# Patient Record
Sex: FEMALE | Race: WHITE | NOT HISPANIC OR LATINO | URBAN - NONMETROPOLITAN AREA
[De-identification: names, ages, dates, MRNs, and addresses within clinical notes are randomized per-mention and may not be internally consistent; named-entity substitution may affect disease eponyms.]

---

## 2017-05-25 ENCOUNTER — HISTORY (OUTPATIENT)
Dept: MEDSURG UNIT | Facility: OTHER | Age: 57
End: 2017-05-25

## 2017-06-02 ENCOUNTER — AMBULATORY - GICH (OUTPATIENT)
Dept: SCHEDULING | Facility: OTHER | Age: 57
End: 2017-06-02

## 2017-06-20 ENCOUNTER — OFFICE VISIT - GICH (OUTPATIENT)
Dept: OTOLARYNGOLOGY | Facility: OTHER | Age: 57
End: 2017-06-20

## 2017-06-20 DIAGNOSIS — Z00.00 ENCOUNTER FOR GENERAL ADULT MEDICAL EXAMINATION WITHOUT ABNORMAL FINDINGS: ICD-10-CM

## 2017-08-08 ENCOUNTER — AMBULATORY - GICH (OUTPATIENT)
Dept: SCHEDULING | Facility: OTHER | Age: 57
End: 2017-08-08

## 2017-12-22 ENCOUNTER — AMBULATORY - GICH (OUTPATIENT)
Dept: PHYSICAL THERAPY | Facility: OTHER | Age: 57
End: 2017-12-22

## 2017-12-22 DIAGNOSIS — M51.16 INTERVERTEBRAL DISC DISORDER WITH RADICULOPATHY OF LUMBAR REGION: ICD-10-CM

## 2017-12-27 NOTE — PROGRESS NOTES
Patient Information     Patient Name MRN Brittney Hanna [id number] Female 1960      Progress Notes by Malika Vallejo at 2017  1:00 PM     Author:  Malika Vallejo Service:  (none) Author Type:  (none)     Filed:  2017  1:05 PM Encounter Date:  2017 Status:  Signed     :  Malika Vallejo            See scanned document.

## 2018-01-26 ENCOUNTER — TRANSFERRED RECORDS (OUTPATIENT)
Dept: HEALTH INFORMATION MANAGEMENT | Facility: CLINIC | Age: 58
End: 2018-01-26

## 2018-01-29 ENCOUNTER — DOCUMENTATION ONLY (OUTPATIENT)
Dept: FAMILY MEDICINE | Facility: OTHER | Age: 58
End: 2018-01-29

## 2018-01-29 PROBLEM — A41.9 SEPSIS DUE TO URINARY TRACT INFECTION (H): Status: ACTIVE | Noted: 2017-05-25

## 2018-01-29 PROBLEM — J44.1 COPD EXACERBATION (H): Status: ACTIVE | Noted: 2017-05-25

## 2018-01-29 PROBLEM — N39.0 SEPSIS DUE TO URINARY TRACT INFECTION (H): Status: ACTIVE | Noted: 2017-05-25

## 2018-01-29 RX ORDER — FEXOFENADINE HCL 180 MG/1
180 TABLET ORAL DAILY PRN
COMMUNITY
Start: 2016-06-24

## 2018-01-29 RX ORDER — ATORVASTATIN CALCIUM 20 MG/1
20 TABLET, FILM COATED ORAL EVERY EVENING
COMMUNITY
Start: 2016-06-24

## 2018-01-29 RX ORDER — SERTRALINE HYDROCHLORIDE 100 MG/1
200 TABLET, FILM COATED ORAL AT BEDTIME
COMMUNITY
Start: 2016-06-24

## 2018-01-29 RX ORDER — LEVOTHYROXINE SODIUM 75 UG/1
75 TABLET ORAL
COMMUNITY
Start: 2016-06-24

## 2018-01-29 RX ORDER — PEN NEEDLE, DIABETIC 30 GX3/16"
NEEDLE, DISPOSABLE MISCELLANEOUS
COMMUNITY
Start: 2016-06-24

## 2018-01-29 RX ORDER — IPRATROPIUM BROMIDE AND ALBUTEROL SULFATE 2.5; .5 MG/3ML; MG/3ML
3 SOLUTION RESPIRATORY (INHALATION) 4 TIMES DAILY
COMMUNITY
Start: 2016-06-24

## 2018-01-29 RX ORDER — ALBUTEROL SULFATE 0.83 MG/ML
2.5 SOLUTION RESPIRATORY (INHALATION) EVERY 4 HOURS PRN
COMMUNITY
Start: 2016-06-24

## 2018-01-29 RX ORDER — LISINOPRIL 5 MG/1
2.5 TABLET ORAL DAILY
COMMUNITY
Start: 2016-06-24

## 2018-01-29 RX ORDER — LEVOTHYROXINE SODIUM 200 UG/1
200 TABLET ORAL
COMMUNITY
Start: 2016-06-24

## 2018-01-29 RX ORDER — ALBUTEROL SULFATE 90 UG/1
1-2 AEROSOL, METERED RESPIRATORY (INHALATION) EVERY 4 HOURS PRN
COMMUNITY
Start: 2016-06-24

## 2018-01-29 RX ORDER — FOLIC ACID 1 MG/1
1 TABLET ORAL DAILY
COMMUNITY
Start: 2016-06-24

## 2018-01-29 RX ORDER — LORAZEPAM 0.5 MG/1
0.5 TABLET ORAL 2 TIMES DAILY PRN
COMMUNITY
Start: 2016-06-24

## 2018-01-29 RX ORDER — LORATADINE 10 MG/1
10 TABLET ORAL DAILY
COMMUNITY
Start: 2016-06-24

## 2018-01-29 RX ORDER — ASPIRIN 81 MG/1
81 TABLET ORAL DAILY
COMMUNITY
Start: 2016-06-24

## 2018-03-06 ENCOUNTER — OFFICE VISIT (OUTPATIENT)
Dept: OTOLARYNGOLOGY | Facility: OTHER | Age: 58
End: 2018-03-06
Attending: OTOLARYNGOLOGY
Payer: COMMERCIAL

## 2018-03-06 DIAGNOSIS — Z00.00 ROUTINE GENERAL MEDICAL EXAMINATION AT A HEALTH CARE FACILITY: Primary | ICD-10-CM

## 2018-03-06 PROCEDURE — G0463 HOSPITAL OUTPT CLINIC VISIT: HCPCS

## 2018-03-06 NOTE — MR AVS SNAPSHOT
"              After Visit Summary   3/6/2018    Brittney Kirk    MRN: [id number]           Patient Information     Date Of Birth          1960        Visit Information        Provider Department      3/6/2018 1:00 PM Brian Alfaro MD [facility]        Today's Diagnoses     Routine general medical examination at a health care facility    -  1       Follow-ups after your visit        Your next 10 appointments already scheduled     Mar 06, 2018  1:00 PM CST   SHORT with Brian Alfaro MD   [facility] ([facility])    [address]  [place] [id number] [phone]              Who to contact     If you have questions or need follow up information about today's clinic visit or your schedule please contact [facility] directly at [phone].  Normal or non-critical lab and imaging results will be communicated to you by In[employer], letter or phone within 4 business days after the clinic has received the results. If you do not hear from us within 7 days, please contact the clinic through In[employer] or phone. If you have a critical or abnormal lab result, we will notify you by phone as soon as possible.  Submit refill requests through [employer] or call your pharmacy and they will forward the refill request to us. Please allow 3 business days for your refill to be completed.          Additional Information About Your Visit        MyChart Information     [employer]MEA lets you send messages to your doctor, view your test results, renew your prescriptions, schedule appointments and more. To sign up, go to [email or web address] . Click on \"Log in\" on the left side of the screen, which will take you to the Welcome page. Then click on \"Sign up Now\" on the right side of the page.     You will be asked to enter the access code listed below, as well as some personal information. Please follow the directions to create your username " and password.     Your access code is: [email or web address]  Expires: 2018 12:52 PM     Your access code will  in 90 days. If you need help or a new code, please call your [place] clinic or [phone].        Care EveryWhere ID     This is your Care EveryWhere ID. This could be used by other organizations to access your [place] medical records  [id number]         Blood Pressure from Last 3 Encounters:   16 130/72    Weight from Last 3 Encounters:   16 (!) 140.6 kg (310 lb)              Today, you had the following     No orders found for display       Primary Care Provider Fax #    Physician No Ref-Primary [phone]       No address on file        Equal Access to Services     JOYCE NARVAEZ : Mini Joel, waaxda luqadaha, qaybta kaalmada adejaretyaashley, milli singletary . So [facility] [phone].    ATENCIÓN: Si habla español, tiene a posey disposición servicios gratuitos de asistencia lingüística. Llame al [phone].    We comply with applicable federal civil rights laws and [place] laws. We do not discriminate on the basis of race, color, national origin, age, disability, sex, sexual orientation, or gender identity.            Thank you!     Thank you for choosing [facility]  for your care. Our goal is always to provide you with excellent care. Hearing back from our patients is one way we can continue to improve our services. Please take a few minutes to complete the written survey that you may receive in the mail after your visit with us. Thank you!             Your Updated Medication List - Protect others around you: Learn how to safely use, store and throw away your medicines at www.disposemymeds.org.          This list is accurate as of 3/6/18 12:52 PM.  Always use your most recent med list.                   Brand Name Dispense Instructions for use Diagnosis    * albuterol (2.5 MG/3ML) 0.083% neb solution      Take 2.5 mg by  nebulization every 4 hours as needed for shortness of breath / dyspnea        * albuterol 108 (90 BASE) MCG/ACT Inhaler    PROAIR HFA/PROVENTIL HFA/VENTOLIN HFA     Inhale 1-2 puffs into the lungs every 4 hours as needed for cough, wheezing or shortness of breath / dyspnea        aspirin EC 81 MG EC tablet      Take 81 mg by mouth daily        atorvastatin 20 MG tablet    LIPITOR     Take 20 mg by mouth every evening With meal        BL TEST STRIP PACK VI      Test blood sugar before each meal.  Dispense item covered by pt ins.        blood glucose lancets standard    no brand specified     Test blood sugar before each meal.  Dispense item covered by pt ins.        fexofenadine 180 MG tablet    ALLEGRA     Take 180 mg by mouth daily as needed For allergy symptoms (rhinitis)        folic acid 1 MG tablet    FOLVITE     Take 1 mg by mouth daily        insulin aspart 100 UNIT/ML injection    NovoLOG PEN     Inject 35 Units Subcutaneous 3 times daily (before meals)        insulin glargine 100 UNIT/ML injection    LANTUS     Inject 42 Units Subcutaneous 2 times daily        ipratropium - albuterol 0.5 mg/2.5 mg/3 mL 0.5-2.5 (3) MG/3ML neb solution    DUONEB     Take 3 mLs by nebulization 4 times daily        * levothyroxine 200 MCG tablet    SYNTHROID/LEVOTHROID     Take 200 mcg by mouth daily (with breakfast) Take with 75mcg tablet.        * levothyroxine 75 MCG tablet    SYNTHROID/LEVOTHROID     Take 75 mcg by mouth every morning (before breakfast) Take with 200mcg tablet.        lisinopril 5 MG tablet    PRINIVIL/ZESTRIL     Take 2.5 mg by mouth daily        loratadine 10 MG tablet    CLARITIN     Take 10 mg by mouth daily        LORazepam 0.5 MG tablet    ATIVAN     Take 0.5 mg by mouth 2 times daily as needed for anxiety        OMEGA-3 FATTY ACIDS-VITAMIN E PO      Take 1 capsule by mouth 3 times daily with meals.        Pen Needles 32G X 4 MM Misc      As directed. For administering insulin at home 5 times daily         sertraline 100 MG tablet    ZOLOFT     Take 200 mg by mouth At Bedtime        VITAMIN D-1000 MAX ST 1000 UNITS Tabs   Generic drug:  cholecalciferol      Take 4,000 Units by mouth daily        * Notice:  This list has 4 medication(s) that are the same as other medications prescribed for you. Read the directions carefully, and ask your doctor or other care provider to review them with you.

## 2018-03-31 ENCOUNTER — TRANSFERRED RECORDS (OUTPATIENT)
Dept: HEALTH INFORMATION MANAGEMENT | Facility: CLINIC | Age: 58
End: 2018-03-31

## 2018-06-01 ENCOUNTER — TRANSFERRED RECORDS (OUTPATIENT)
Dept: HEALTH INFORMATION MANAGEMENT | Facility: CLINIC | Age: 58
End: 2018-06-01

## 2018-06-02 ENCOUNTER — TRANSFERRED RECORDS (OUTPATIENT)
Dept: HEALTH INFORMATION MANAGEMENT | Facility: CLINIC | Age: 58
End: 2018-06-02

## 2018-06-07 ENCOUNTER — TRANSFERRED RECORDS (OUTPATIENT)
Dept: HEALTH INFORMATION MANAGEMENT | Facility: CLINIC | Age: 58
End: 2018-06-07

## 2018-06-08 ENCOUNTER — TRANSFERRED RECORDS (OUTPATIENT)
Dept: HEALTH INFORMATION MANAGEMENT | Facility: CLINIC | Age: 58
End: 2018-06-08

## 2018-06-10 ENCOUNTER — TRANSFERRED RECORDS (OUTPATIENT)
Dept: HEALTH INFORMATION MANAGEMENT | Facility: CLINIC | Age: 58
End: 2018-06-10

## 2018-06-14 ENCOUNTER — PRE VISIT (OUTPATIENT)
Dept: UROLOGY | Facility: CLINIC | Age: 58
End: 2018-06-14

## 2018-06-14 NOTE — TELEPHONE ENCOUNTER
MEDICAL RECORDS REQUEST   [facility]  Urology Clinic  [address] [place] [address]  PHONE: [phone]  Fax: [phone]        FUTURE VISIT INFORMATION                                                   Brittney Kirk, : 1960 scheduled for future visit at [facility]    APPOINTMENT INFORMATION:    Date: 07/10/2018    Provider:  MARK LOW    Reason for Visit/Diagnosis: STONES    REFERRAL INFORMATION:    Referring provider:  SELF    Specialty: SELF    Referring providers clinic:  SELF    Clinic contact number:  SELF    RECORDS REQUESTED FOR VISIT                                                     NOTES  STATUS/DETAILS   OFFICE NOTE from referring provider  no   OFFICE NOTE from other specialist  no   DISCHARGE SUMMARY from hospital  no   DISCHARGE REPORT from the ER  no   OPERATIVE REPORT  no   MEDICATION LIST  YES   KIDNEY STONE     CT STONE  YES   IMAGING (IMAGES & REPORT)  YES   KUB  NO       PRE-VISIT CHECKLIST      Record collection complete YES RECORDS SENT TO SCANNING. .CDK   Appointment appropriately scheduled           (right time/right provider) Yes   Joint diagnostic appointment coordinated correctly          (ensure right order & amount of time) Yes   MyChart activation Yes   Questionnaire complete If no, please explain IN PROCESS     Completed by: Mihaela Jordan

## 2018-06-15 ENCOUNTER — PRE VISIT (OUTPATIENT)
Dept: UROLOGY | Facility: CLINIC | Age: 58
End: 2018-06-15

## 2018-07-30 DIAGNOSIS — N20.0 KIDNEY STONE: Primary | ICD-10-CM

## 2019-09-02 ENCOUNTER — HOSPITAL ENCOUNTER (EMERGENCY)
Facility: OTHER | Age: 59
Discharge: HOME | End: 2019-09-02
Attending: FAMILY MEDICINE | Admitting: FAMILY MEDICINE
Payer: COMMERCIAL

## 2019-09-02 ENCOUNTER — APPOINTMENT (OUTPATIENT)
Dept: GENERAL RADIOLOGY | Facility: OTHER | Age: 59
End: 2019-09-02
Attending: FAMILY MEDICINE
Payer: COMMERCIAL

## 2019-09-02 VITALS
RESPIRATION RATE: 28 BRPM | HEART RATE: 104 BPM | HEIGHT: 63 IN | WEIGHT: 272 LBS | SYSTOLIC BLOOD PRESSURE: 122 MMHG | TEMPERATURE: 100.7 F | OXYGEN SATURATION: 98 % | BODY MASS INDEX: 48.2 KG/M2 | DIASTOLIC BLOOD PRESSURE: 64 MMHG

## 2019-09-02 DIAGNOSIS — N30.00 ACUTE CYSTITIS WITHOUT HEMATURIA: ICD-10-CM

## 2019-09-02 LAB
ALBUMIN SERPL-MCNC: 3.1 G/DL (ref 3.5–5.7)
ALBUMIN UR-MCNC: ABNORMAL MG/DL
ALP SERPL-CCNC: 81 U/L (ref 34–104)
ALT SERPL W P-5'-P-CCNC: 14 U/L (ref 7–52)
AMORPH CRY #/AREA URNS HPF: ABNORMAL /HPF
ANION GAP SERPL CALCULATED.3IONS-SCNC: 10 MMOL/L (ref 3–14)
APPEARANCE UR: ABNORMAL
AST SERPL W P-5'-P-CCNC: 17 U/L (ref 13–39)
BACTERIA #/AREA URNS HPF: ABNORMAL /HPF
BASOPHILS # BLD AUTO: 0 10E9/L (ref 0–0.2)
BASOPHILS NFR BLD AUTO: 0.3 %
BILIRUB SERPL-MCNC: 0.5 MG/DL (ref 0.3–1)
BILIRUB UR QL STRIP: NEGATIVE
BUN SERPL-MCNC: 25 MG/DL (ref 7–25)
CALCIUM SERPL-MCNC: 9.2 MG/DL (ref 8.6–10.3)
CHLORIDE SERPL-SCNC: 96 MMOL/L (ref 98–107)
CO2 SERPL-SCNC: 22 MMOL/L (ref 21–31)
COLOR UR AUTO: YELLOW
CREAT SERPL-MCNC: 1.21 MG/DL (ref 0.6–1.2)
DIFFERENTIAL METHOD BLD: ABNORMAL
EOSINOPHIL # BLD AUTO: 0 10E9/L (ref 0–0.7)
EOSINOPHIL NFR BLD AUTO: 0.2 %
ERYTHROCYTE [DISTWIDTH] IN BLOOD BY AUTOMATED COUNT: 13.9 % (ref 10–15)
GFR SERPL CREATININE-BSD FRML MDRD: 45 ML/MIN/{1.73_M2}
GLUCOSE SERPL-MCNC: 147 MG/DL (ref 70–105)
GLUCOSE UR STRIP-MCNC: NEGATIVE MG/DL
HCT VFR BLD AUTO: 35.6 % (ref 35–47)
HGB BLD-MCNC: 12.2 G/DL (ref 11.7–15.7)
HGB UR QL STRIP: ABNORMAL
IMM GRANULOCYTES # BLD: 0.2 10E9/L (ref 0–0.4)
IMM GRANULOCYTES NFR BLD: 1.2 %
INR PPP: 1.25 (ref 0–1.3)
KETONES UR STRIP-MCNC: NEGATIVE MG/DL
LACTATE SERPL-SCNC: 1.6 MMOL/L (ref 0.5–2.2)
LEUKOCYTE ESTERASE UR QL STRIP: ABNORMAL
LYMPHOCYTES # BLD AUTO: 1.5 10E9/L (ref 0.8–5.3)
LYMPHOCYTES NFR BLD AUTO: 10.3 %
MCH RBC QN AUTO: 29.8 PG (ref 26.5–33)
MCHC RBC AUTO-ENTMCNC: 34.3 G/DL (ref 31.5–36.5)
MCV RBC AUTO: 87 FL (ref 78–100)
MONOCYTES # BLD AUTO: 0.6 10E9/L (ref 0–1.3)
MONOCYTES NFR BLD AUTO: 4.1 %
NEUTROPHILS # BLD AUTO: 12.6 10E9/L (ref 1.6–8.3)
NEUTROPHILS NFR BLD AUTO: 83.9 %
NITRATE UR QL: NEGATIVE
NON-SQ EPI CELLS #/AREA URNS LPF: ABNORMAL /LPF
PH UR STRIP: 6 PH (ref 5–9)
PLATELET # BLD AUTO: 447 10E9/L (ref 150–450)
POTASSIUM SERPL-SCNC: 4.5 MMOL/L (ref 3.5–5.1)
PROT SERPL-MCNC: 8 G/DL (ref 6.4–8.9)
RBC # BLD AUTO: 4.1 10E12/L (ref 3.8–5.2)
RBC #/AREA URNS AUTO: ABNORMAL /HPF
SODIUM SERPL-SCNC: 128 MMOL/L (ref 134–144)
SOURCE: ABNORMAL
SP GR UR STRIP: 1.01 (ref 1–1.03)
UROBILINOGEN UR STRIP-ACNC: 1 EU/DL (ref 0.2–1)
WBC # BLD AUTO: 15 10E9/L (ref 4–11)
WBC #/AREA URNS AUTO: >100 /HPF

## 2019-09-02 PROCEDURE — 96365 THER/PROPH/DIAG IV INF INIT: CPT | Performed by: FAMILY MEDICINE

## 2019-09-02 PROCEDURE — 87040 BLOOD CULTURE FOR BACTERIA: CPT | Performed by: FAMILY MEDICINE

## 2019-09-02 PROCEDURE — 85025 COMPLETE CBC W/AUTO DIFF WBC: CPT | Performed by: FAMILY MEDICINE

## 2019-09-02 PROCEDURE — 87077 CULTURE AEROBIC IDENTIFY: CPT | Performed by: FAMILY MEDICINE

## 2019-09-02 PROCEDURE — 99284 EMERGENCY DEPT VISIT MOD MDM: CPT | Mod: 25 | Performed by: FAMILY MEDICINE

## 2019-09-02 PROCEDURE — 81001 URINALYSIS AUTO W/SCOPE: CPT | Performed by: FAMILY MEDICINE

## 2019-09-02 PROCEDURE — 80053 COMPREHEN METABOLIC PANEL: CPT | Performed by: FAMILY MEDICINE

## 2019-09-02 PROCEDURE — 99283 EMERGENCY DEPT VISIT LOW MDM: CPT | Mod: Z6 | Performed by: FAMILY MEDICINE

## 2019-09-02 PROCEDURE — 25000128 H RX IP 250 OP 636: Performed by: FAMILY MEDICINE

## 2019-09-02 PROCEDURE — 87086 URINE CULTURE/COLONY COUNT: CPT | Performed by: FAMILY MEDICINE

## 2019-09-02 PROCEDURE — 83605 ASSAY OF LACTIC ACID: CPT | Performed by: FAMILY MEDICINE

## 2019-09-02 PROCEDURE — 36415 COLL VENOUS BLD VENIPUNCTURE: CPT | Performed by: FAMILY MEDICINE

## 2019-09-02 PROCEDURE — 71045 X-RAY EXAM CHEST 1 VIEW: CPT

## 2019-09-02 PROCEDURE — 85610 PROTHROMBIN TIME: CPT | Performed by: FAMILY MEDICINE

## 2019-09-02 RX ORDER — CIPROFLOXACIN 2 MG/ML
400 INJECTION, SOLUTION INTRAVENOUS ONCE
Status: COMPLETED | OUTPATIENT
Start: 2019-09-02 | End: 2019-09-02

## 2019-09-02 RX ORDER — CIPROFLOXACIN 250 MG/1
250 TABLET, FILM COATED ORAL 2 TIMES DAILY
Qty: 10 TABLET | Refills: 0 | Status: SHIPPED | OUTPATIENT
Start: 2019-09-02 | End: 2019-09-07

## 2019-09-02 RX ORDER — SODIUM CHLORIDE 9 MG/ML
1000 INJECTION, SOLUTION INTRAVENOUS CONTINUOUS
Status: DISCONTINUED | OUTPATIENT
Start: 2019-09-02 | End: 2019-09-02 | Stop reason: HOSPADM

## 2019-09-02 RX ADMIN — CIPROFLOXACIN 400 MG: 2 INJECTION, SOLUTION INTRAVENOUS at 15:48

## 2019-09-02 RX ADMIN — SODIUM CHLORIDE 1000 ML: 9 INJECTION, SOLUTION INTRAVENOUS at 15:07

## 2019-09-02 ASSESSMENT — MIFFLIN-ST. JEOR: SCORE: 1772.91

## 2019-09-02 NOTE — ED TRIAGE NOTES
"Pt arrives to ED via EMS. Pt has h/o \"bladder pain\" for one month. Pt was seen about 10 days ago in [facility] and ws placed on antibiotics. Pt continues to have pain when urinating and intermittent blood in urine. Pt has h/o kidney stones.    Cherry Diaz RN on 9/2/2019 at 1:39 PM    "

## 2019-09-02 NOTE — ED PROVIDER NOTES
"  History     Chief Complaint   Patient presents with     Abdominal Pain     Dysuria     HPI  Brittney Kirk is a 59 year old female who was seen in the [facility] system recently for a UTI.  Treated with omnicef, which was effective.  Her U/A culture is reviewed.  Had proteus and a small amount of E.coli.    Since then, she states she has not improved, describing one month worth of lower abdominal pain with bladder spasms and pain.    Now feeling sick, 'punky', and feverish.      She describes her pain as lower abdomen.     Prior surgical abdominal hx includes hysterectomy, gallbladder, and apparently several others noted below.  She thinks she still has her appendix.  Also, has a hx of aortic aneurysm.    No flank pains.      No bloody urine.    No stated vaginal discharge beyond baseline.    Some nausea, and some mild diarrhea.  No obvious vomiting.     No stated melena.    Allergies:  Allergies   Allergen Reactions     Doxycycline Shortness Of Breath, Hives and Nausea and Vomiting     Metronidazole Shortness Of Breath, Hives and Nausea and Vomiting     Flagyl     Penicillins Shortness Of Breath, Hives and Nausea and Vomiting     Tetracycline Shortness Of Breath, Hives, Nausea and Vomiting and Rash     Erythromycin      Other reaction(s): *Unknown     Fluticasone-Salmeterol      Other reaction(s): Intolerance-Can't Take  Jittery, confused, \"felt awful.\"     Liquid Adhesive      Other reaction(s): *Unknown     Latex Rash     Sulfamethoxazole W/Trimethoprim Nausea and Vomiting and Rash       Problem List:    Patient Active Problem List    Diagnosis Date Noted     COPD exacerbation (H) 05/25/2017     Priority: Medium     Sepsis due to urinary tract infection (H) 05/25/2017     Priority: Medium     Solitary kidney 06/27/2016     Priority: Medium     Staghorn calculus 06/27/2016     Priority: Medium        Past Medical History:    Past Medical History:   Diagnosis Date     Abdominal aortic aneurysm without rupture (H)  "     Abnormal radiologic findings on diagnostic imaging of renal pelvis, ureter, or bladder      Abnormal uterine and vaginal bleeding, unspecified (CODE)      Acute bronchitis      Agoraphobia without panic disorder      Anxiety disorder      Calculus of gallbladder without cholecystitis without obstruction      Calculus of kidney      Calculus of kidney      Cardiomegaly      Carpal tunnel syndrome      Chest pain      Chronic obstructive pulmonary disease (H)      Chronic obstructive pulmonary disease (H)      Chronic sinusitis      Cyst of ovary      Diaphragmatic hernia without obstruction or gangrene      Disease of digestive system      Dorsalgia      Dysphagia      Enlarged lymph nodes      Esophagitis      Excessive and frequent menstruation with regular cycle      Gastritis without bleeding      Gastro-esophageal reflux disease without esophagitis      Generalized abdominal pain      Hemorrhage of anus and rectum      Hyperlipidemia      Hypothyroidism      Major depressive disorder, recurrent severe without psychotic features (H)      Morbid (severe) obesity due to excess calories (H)      Nicotine dependence, uncomplicated      Other abnormal and inconclusive findings on diagnostic imaging of breast      Other intervertebral disc displacement, lumbar region      Other symptoms and signs involving the musculoskeletal system      Personal history of urinary infection      Polyosteoarthritis      Polyp of vocal cord and larynx (CODE)      Radiculopathy of lumbosacral region      Sacroiliitis, not elsewhere classified (H)      Shortness of breath      Sleep apnea      Spinal stenosis of lumbar region      Stress incontinence      Type 2 diabetes mellitus with diabetic polyneuropathy (H)      Type 2 diabetes mellitus without complications (H)      Uncomplicated asthma        Past Surgical History:    Past Surgical History:   Procedure Laterality Date     BIOPSY BREAST      negative, 15+ years ago       SECTION       section x1 for placenta previa     COLONOSCOPY      2007,mild sigmoid diverticulosis.  scattered colonic diverticula.  polyp at hepatic flexure. rectal polyp     FINGER SURGERY      hand surgery     HYSTERECTOMY VAGINAL      2004,vaginal hysterectomy; BSO; cystoscopy     OTHER SURGICAL HISTORY      7/15/2004,37384.0,NE BIOPSY OF UTERUS LINING,endometrial biopsy     OTHER SURGICAL HISTORY      2004,883981,CYSTOURETHROSCOPY,cystoscopy     OTHER SURGICAL HISTORY      2007,73140.0,NE EGD TRANSORAL BIOPSY SINGLE OR MULTIPLE     OTHER SURGICAL HISTORY      2009,34666.0,NE EGD TRANSORAL BIOPSY SINGLE OR MULTIPLE,biopsies taken both in the subcarinal space and the periportal/peripancreatic region     OTHER SURGICAL HISTORY      2012,09891.0,NE EGD TRANSORAL BIOPSY SINGLE OR MULTIPLE,squamous epithelium with mild reflux changes     OTHER SURGICAL HISTORY      2006,05712.0,NM ESOPHAGUS MOTILITY,study with interp and report 2D     OTHER SURGICAL HISTORY      2001,THB654,LAPAROSCOPIC CHOLECYSTECTOMY,dx: gallbladder: chronic cholecystitis with cholelithiasis     OTHER SURGICAL HISTORY      3/14/2007,.0,NE ADJ GASTRIC BAND DIAM SUBQ PORT,removal of, laparoscopic     OTHER SURGICAL HISTORY      2012,805658,LARYNGOSCOPY FLEXIBLE,remove lesion     OTHER SURGICAL HISTORY      2002,KSQ170,KNEE SURGERY,Left,surgery done by Dr. Oates at [place], [place]     OTHER SURGICAL HISTORY      2001,FIS613,KNEE SURGERY,Right,surgery done by Dr. Oates at [place], [place]     OTHER SURGICAL HISTORY      2002,378120,SLEEP STUDY PER PROTOCOL     OTHER SURGICAL HISTORY      1999,107403,US RF ABLATION THYROID/PARATHYROID,thyroid ablation     OTHER SURGICAL HISTORY      2009,81386.0,(IA) NE UPPER GI ENDOSCOPY EXAM,EUS - guided fine needle aspiration     RELEASE CARPAL TUNNEL      2001     RELEASE CARPAL TUNNEL       3/28/2001       Family History:    Family History   Problem Relation Age of Onset     Breast Cancer Mother 50        Cancer-breast     Colon Cancer Father         Cancer-colon     Other - See Comments Father 50        CVA at age 50     Heart Disease Other         Heart Disease,severe CAD in maternal family     Diabetes Paternal Grandfather         Diabetes       Social History:  Marital Status:   [4]  Social History     Tobacco Use     Smoking status: Current Every Day Smoker     Packs/day: 1.00     Years: 40.00     Pack years: 40.00     Types: Cigarettes     Smokeless tobacco: Never Used     Tobacco comment: Quit smoking: smoked 1 pack/day for 40 years, cut way down, plans to use patches to quit soon   Substance Use Topics     Alcohol use: No     Alcohol/week: 0.0 oz     Drug use: Unknown     Types: Other     Comment: Drug use: No        Medications:      ciprofloxacin (CIPRO) 250 MG tablet   insulin aspart (NOVOLOG PEN) 100 UNIT/ML injection   insulin glargine (LANTUS) 100 UNIT/ML injection   levothyroxine (SYNTHROID/LEVOTHROID) 200 MCG tablet   levothyroxine (SYNTHROID/LEVOTHROID) 75 MCG tablet   loratadine (CLARITIN) 10 MG tablet   sertraline (ZOLOFT) 100 MG tablet   albuterol (2.5 MG/3ML) 0.083% neb solution   albuterol (PROAIR HFA/PROVENTIL HFA/VENTOLIN HFA) 108 (90 BASE) MCG/ACT Inhaler   aspirin EC 81 MG EC tablet   atorvastatin (LIPITOR) 20 MG tablet   blood glucose (NO BRAND SPECIFIED) lancets standard   cholecalciferol (VITAMIN D-1000 MAX ST) 1000 UNITS TABS   fexofenadine (ALLEGRA) 180 MG tablet   folic acid (FOLVITE) 1 MG tablet   Glucose Blood (BL TEST STRIP PACK VI)   Insulin Pen Needle (PEN NEEDLES) 32G X 4 MM MISC   ipratropium - albuterol 0.5 mg/2.5 mg/3 mL (DUONEB) 0.5-2.5 (3) MG/3ML neb solution   lisinopril (PRINIVIL/ZESTRIL) 5 MG tablet   LORazepam (ATIVAN) 0.5 MG tablet   OMEGA-3 FATTY ACIDS-VITAMIN E PO         Review of Systems  No chills or rigors, HEENT, SOB, CP.  Physical Exam  "  BP: 122/64  Pulse: 104  Temp: 100.7  F (38.2  C)  Resp: 28  Height: 160 cm (5' 3\")  Weight: 123.4 kg (272 lb)  SpO2: 98 %      Physical Exam  Well woman with stable vitals.  She is not tachypneic.  She has an unusual way of lying which is evidently due to a spinal deformity, which is long-standing.  I believe she is essentially bed-bound.  Heart reg.  Lungs clear.  She moves air well in both lung fields.  The CXR done for a fever workup is essentially useless due to body position.  The hemithorax opacity is body habitus related.  abd soft, mild tenderness noted to suprapubic area.  Otherwise, an morbidly obese abdomen, ND, NABS, negative McBurney's.  No obvious hernias.    Labs show a new UTI.  Doubt this is an associated ureterolithiasis, but it is considered.  WBC 15.  No clinical signs of bactermia/sepsis.    Slight renal insufficiency, unknown what her baseline is.       ED Course        Procedures               Critical Care time:  none               Results for orders placed or performed during the hospital encounter of 09/02/19 (from the past 24 hour(s))   UA reflex to Microscopic and Culture   Result Value Ref Range    Color Urine Yellow     Appearance Urine Slightly Cloudy     Glucose Urine Negative NEG^Negative mg/dL    Bilirubin Urine Negative NEG^Negative    Ketones Urine Negative NEG^Negative mg/dL    Specific Gravity Urine 1.015 1.000 - 1.030    Blood Urine Large (A) NEG^Negative    pH Urine 6.0 5.0 - 9.0 pH    Protein Albumin Urine Trace (A) NEG^Negative mg/dL    Urobilinogen Urine 1.0 0.2 - 1.0 EU/dL    Nitrite Urine Negative NEG^Negative    Leukocyte Esterase Urine Large (A) NEG^Negative    Source Catheterized Urine    Urine Microscopic   Result Value Ref Range    WBC Urine >100 (A) OTO5^0 - 5 /HPF    RBC Urine 10-25 (A) OTO2^O - 2 /HPF    Squamous Epithelial /LPF Urine Few FEW^Few /LPF    Bacteria Urine Moderate (A) NEG^Negative /HPF    Amorphous Crystals Few (A) NEG^Negative /HPF   CBC with " platelets differential   Result Value Ref Range    WBC 15.0 (H) 4.0 - 11.0 10e9/L    RBC Count 4.10 3.8 - 5.2 10e12/L    Hemoglobin 12.2 11.7 - 15.7 g/dL    Hematocrit 35.6 35.0 - 47.0 %    MCV 87 78 - 100 fl    MCH 29.8 26.5 - 33.0 pg    MCHC 34.3 31.5 - 36.5 g/dL    RDW 13.9 10.0 - 15.0 %    Platelet Count 447 150 - 450 10e9/L    Diff Method Automated Method     % Neutrophils 83.9 %    % Lymphocytes 10.3 %    % Monocytes 4.1 %    % Eosinophils 0.2 %    % Basophils 0.3 %    % Immature Granulocytes 1.2 %    Absolute Neutrophil 12.6 (H) 1.6 - 8.3 10e9/L    Absolute Lymphocytes 1.5 0.8 - 5.3 10e9/L    Absolute Monocytes 0.6 0.0 - 1.3 10e9/L    Absolute Eosinophils 0.0 0.0 - 0.7 10e9/L    Absolute Basophils 0.0 0.0 - 0.2 10e9/L    Abs Immature Granulocytes 0.2 0 - 0.4 10e9/L   INR   Result Value Ref Range    INR 1.25 0 - 1.3   Comprehensive metabolic panel   Result Value Ref Range    Sodium 128 (L) 134 - 144 mmol/L    Potassium 4.5 3.5 - 5.1 mmol/L    Chloride 96 (L) 98 - 107 mmol/L    Carbon Dioxide 22 21 - 31 mmol/L    Anion Gap 10 3 - 14 mmol/L    Glucose 147 (H) 70 - 105 mg/dL    Urea Nitrogen 25 7 - 25 mg/dL    Creatinine 1.21 (H) 0.60 - 1.20 mg/dL    GFR Estimate 45 (L) >60 mL/min/[1.73_m2]    GFR Estimate If Black 55 (L) >60 mL/min/[1.73_m2]    Calcium 9.2 8.6 - 10.3 mg/dL    Bilirubin Total 0.5 0.3 - 1.0 mg/dL    Albumin 3.1 (L) 3.5 - 5.7 g/dL    Protein Total 8.0 6.4 - 8.9 g/dL    Alkaline Phosphatase 81 34 - 104 U/L    ALT 14 7 - 52 U/L    AST 17 13 - 39 U/L   Lactic acid   Result Value Ref Range    Lactic Acid 1.6 0.5 - 2.2 mmol/L   XR Chest 1 View    Narrative    EXAM:XR CHEST 1 VW     CLINICAL HISTORY: Patient Age  59 years Additional clinical info:  sepsis workup     COMPARISON: None      TECHNIQUE: Left lateral decubitus view.      Impression    IMPRESSION: Complete opacification of the left hemithorax. The  visualized right lung appears clear although the right costophrenic  angle is excluded from the  field-of-view. The lower cardiac silhouette  appears shifted to the right.    DORY ELLER MD       Medications   0.9% sodium chloride BOLUS (1,000 mLs Intravenous New Bag 9/2/19 2588)     Followed by   sodium chloride 0.9% infusion (has no administration in time range)   ciprofloxacin (CIPRO) infusion 400 mg (has no administration in time range)       Assessments & Plan (with Medical Decision Making)     I have reviewed the nursing notes.    I have reviewed the findings, diagnosis, plan and need for follow up with the patient.   at this time, I feel we can treat this as an outpatient.      I gave first dose of IV cipro, and will prescribe cipro 250mg BID for 5 days as UTI treatment.  Will steer clear of omnicef, as she was just on that about one week ago.    There is no clinical signs of sepsis at this time, and I do not feel she warrants hospitalization.     Other considerations regarding her abdominal pain of one month are considered, but the most likely scenario is bladder spasms associated with recurrent UTIs.    She will return with worsening symptoms, and is made aware that she should check her blood sugars closely while on cipro.      New Prescriptions    CIPROFLOXACIN (CIPRO) 250 MG TABLET    Take 1 tablet (250 mg) by mouth 2 times daily for 5 days       Final diagnoses:   Acute cystitis without hematuria       9/2/2019   [facility] [place] [facility], Azael HERMOSILLO MD  09/02/19 3777

## 2019-09-02 NOTE — ED AVS SNAPSHOT
[facility]  [address]  [place] [address]  Phone:  [phone]  Fax:  [phone]                                    Brittney Kirk   MRN: [id number]    Department:  [facility]   Date of Visit:  9/2/2019           After Visit Summary Signature Page    I have received my discharge instructions, and my questions have been answered. I have discussed any challenges I see with this plan with the nurse or doctor.    ..........................................................................................................................................  Patient/Patient Representative Signature      ..........................................................................................................................................  Patient Representative Print Name and Relationship to Patient    ..................................................               ................................................  Date                                   Time    ..........................................................................................................................................  Reviewed by Signature/Title    ...................................................              ..............................................  Date                                               Time          22EPIC Rev 08/18

## 2019-09-04 LAB
BACTERIA SPEC CULT: NORMAL
SPECIMEN SOURCE: NORMAL

## 2019-09-04 NOTE — RESULT ENCOUNTER NOTE
Final urine culture report is NEGATIVE per [facility] ED Lab Result protocol.    If NEGATIVE result, no change in treatment, per [facility] ED Lab Result protocol.

## 2019-09-05 NOTE — ED NOTES
I was contacted yesterday by lab with positive blood cultures.  I was able to reach the patient's daughter and home.  She reported that the patient was not improving despite being on antibiotics.  I informed the patient's daughter that the patient needs to be brought to the hospital for further evaluation.  The patient is from the [facility] and the daughter called the ambulance EMS brought this patient to [facility] for further evaluation at that time.     Yuan Britt PA-C  09/05/19 1045

## 2019-09-08 LAB
BACTERIA SPEC CULT: ABNORMAL
BACTERIA SPEC CULT: ABNORMAL
BACTERIA SPEC CULT: NORMAL
SPECIMEN SOURCE: ABNORMAL
SPECIMEN SOURCE: NORMAL

## 2024-05-24 NOTE — RESULT ENCOUNTER NOTE
This rosuvastatin wasn't prescribed by you.    Regarding blood work on 3/12/24  Your labs look really good other than your cholesterol is still elevated. Your LDL or \"bad\" cholesterol is okay at 111 but your HDL or healthy cholesterol is low at 46 and your triglycerides are very elevated as well. The concern with high triglycerides is it puts you at higher risk for developing pancreatitis. Try to work on decreasing saturated fats, processed foods, and carbs. Increase foods with healthy fats such as tuna, salmon, nuts, avocado, and eggs. Also work on increasing fiber intake as fiber binds to cholesterol in the gut.     Future Appointments   Date Time Provider Department Center   8/1/2024  1:00 PM Pamela Blackburn APNP [id number]     Plan:  Return in about 3 months (around 6/11/2024) for CPE.   See telephone encounter on 9/5/19  Patients daughter was notified of result by Yuan Light PA-C

## (undated) RX ORDER — CIPROFLOXACIN 2 MG/ML
INJECTION, SOLUTION INTRAVENOUS
Status: DISPENSED
Start: 2019-09-02

## (undated) RX ORDER — SODIUM CHLORIDE 9 MG/ML
INJECTION, SOLUTION INTRAVENOUS
Status: DISPENSED
Start: 2019-09-02